# Patient Record
Sex: MALE | Race: WHITE | NOT HISPANIC OR LATINO | Employment: STUDENT | URBAN - METROPOLITAN AREA
[De-identification: names, ages, dates, MRNs, and addresses within clinical notes are randomized per-mention and may not be internally consistent; named-entity substitution may affect disease eponyms.]

---
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
## 2025-04-16 ENCOUNTER — TELEPHONE (OUTPATIENT)
Dept: PEDIATRICS | Facility: CLINIC | Age: 20
End: 2025-04-16
Payer: COMMERCIAL

## 2025-04-17 ENCOUNTER — OFFICE VISIT (OUTPATIENT)
Dept: PEDIATRICS | Facility: CLINIC | Age: 20
End: 2025-04-17
Payer: COMMERCIAL

## 2025-04-17 VITALS — WEIGHT: 150.4 LBS | HEIGHT: 69 IN | BODY MASS INDEX: 22.28 KG/M2

## 2025-04-17 DIAGNOSIS — Z23 NEED FOR PROPHYLACTIC VACCINATION WITH TYPHOID-PARATYPHOID (TAB) VACCINE: ICD-10-CM

## 2025-04-17 DIAGNOSIS — Z91.89 AT RISK FOR INFECTIOUS DISEASE DUE TO RECENT FOREIGN TRAVEL: ICD-10-CM

## 2025-04-17 DIAGNOSIS — Z71.84 ENCOUNTER FOR COUNSELING FOR TRAVEL: Primary | ICD-10-CM

## 2025-04-17 DIAGNOSIS — Z29.89 NEED FOR MALARIA PROPHYLAXIS: ICD-10-CM

## 2025-04-17 PROCEDURE — 3008F BODY MASS INDEX DOCD: CPT | Performed by: PEDIATRICS

## 2025-04-17 PROCEDURE — 99214 OFFICE O/P EST MOD 30 MIN: CPT | Performed by: PEDIATRICS

## 2025-04-17 PROCEDURE — 1036F TOBACCO NON-USER: CPT | Performed by: PEDIATRICS

## 2025-04-17 RX ORDER — ATOVAQUONE AND PROGUANIL HYDROCHLORIDE 250; 100 MG/1; MG/1
1 TABLET, FILM COATED ORAL DAILY
Qty: 21 TABLET | Refills: 0 | Status: SHIPPED | OUTPATIENT
Start: 2025-04-17

## 2025-04-17 RX ORDER — AZITHROMYCIN 500 MG/1
500 TABLET, FILM COATED ORAL DAILY
Qty: 3 TABLET | Refills: 0 | Status: SHIPPED | OUTPATIENT
Start: 2025-04-17 | End: 2025-04-20

## 2025-04-17 RX ORDER — TRETINOIN 0.25 MG/G
CREAM TOPICAL
COMMUNITY
Start: 2024-12-16

## 2025-04-17 RX ORDER — CLINDAMYCIN PHOSPHATE AND BENZOYL PEROXIDE 10; 50 MG/G; MG/G
GEL TOPICAL
COMMUNITY
Start: 2024-12-17

## 2025-04-17 NOTE — PATIENT INSTRUCTIONS
Malaria Prophylaxis:  Malarone: Begin taking 1 tablet 2 days prior to travel, once daily during travel and continue taking for 7 days after travel. Please try to take at the same time every day. This should be taken with food or a milk-based drink.   Side effects: abdominal pain, nausea, vomiting, headache.     Typhoid Prophylaxis:  Vivotif: This is a live vaccine that you take by mouth. You must REFRIGERATE these capsules. You will take 1 capsule every other day x 4 doses. It must be swallowed whole. It works best if you take it 1 hour before a meal and >2 hours after a previous meal. This must be completed  >1 week prior to exposure. This vaccine lasts for 5 years.   Side effects: headache, nausea, vomiting, diarrhea, headache, rash, fever.     Travel's diarrhea:  At the onset of symptoms, please start azithromycin 1 tablet (500mg) daily x 3 days.

## 2025-04-17 NOTE — PROGRESS NOTES
"Pediatric Sick Encounter Note    Subjective   Patient ID: Tirso Gonzales is a 19 y.o. male who presents for travling (Traveling vaccine discussion).  Today he is accompanied by accompanied by mother.     HPI  May 14-27th will be traveling to [facility]   He is up to date on vaccines otherwise  Travel itinerary provided.   He is allergic to sulfa drugs - states a reaction as a young child    Review of Systems    Objective   Ht 1.753 m (5' 9\")   Wt 68.2 kg (150 lb 6.4 oz)   BMI 22.21 kg/m²   BSA: 1.82 meters squared  Growth percentiles: 41 %ile (Z= -0.22) based on [facility] (Boys, 2-20 Years) Stature-for-age data based on Stature recorded on 4/17/2025. 42 %ile (Z= -0.20) based on CDC (Boys, 2-20 Years) weight-for-age data using data from 4/17/2025.     Physical Exam  Vitals and nursing note reviewed.   Constitutional:       Appearance: Normal appearance.   Pulmonary:      Effort: Pulmonary effort is normal.   Skin:     Findings: No rash.   Neurological:      Mental Status: He is alert.         Assessment/Plan   Diagnoses and all orders for this visit:  Encounter for counseling for travel  Need for malaria prophylaxis  -     atovaquone-proguaniL (Malarone) 250-100 mg tablet; Take 1 tablet by mouth once daily.  Need for prophylactic vaccination with typhoid-paratyphoid (TAB) vaccine  -     typhoid (Vivotif) DR capsule vaccine; Take 1 capsule by mouth every other day for 7 days. Do not crush, chew, or split.  At risk for infectious disease due to recent foreign travel  -     atovaquone-proguaniL (Malarone) 250-100 mg tablet; Take 1 tablet by mouth once daily.  -     typhoid (Vivotif) DR capsule vaccine; Take 1 capsule by mouth every other day for 7 days. Do not crush, chew, or split.  -     azithromycin (Zithromax) 500 mg tablet; Take 1 tablet (500 mg) by mouth once daily for 3 days.  Tirso is  a 19 year old male who presents for travel counseling. He will be traveling in 1 month to [facility]. Case was discussed with  " Lata (RBC ID) as well. Long discussed regarding prophylaxis recommended per CDC guidelines, how medications should be stored, how medications should be taken and side effects.  Malaria Prophylaxis:  Malarone: Begin taking 1 tablet 2 days prior to travel, once daily during travel and continue taking for 7 days after travel. Please try to take at the same time every day. This should be taken with food or a milk-based drink.   Side effects: abdominal pain, nausea, vomiting, headache.     Typhoid Prophylaxis:  Vivotif: This is a live vaccine that you take by mouth. You must REFRIGERATE these capsules. You will take 1 capsule every other day x 4 doses. It must be swallowed whole. It works best if you take it 1 hour before a meal and >2 hours after a previous meal. This must be completed  >1 week prior to exposure. This vaccine lasts for 5 years.   Side effects: headache, nausea, vomiting, diarrhea, headache, rash, fever.     Travel's diarrhea:  At the onset of symptoms, please start azithromycin 1 tablet (500mg) daily x 3 days.   Total time: 35 minutes (>50% was directly with patient counseling)

## 2025-07-01 ASSESSMENT — DERMATOLOGY QUALITY OF LIFE (QOL) ASSESSMENT
RATE HOW EMOTIONALLY BOTHERED YOU ARE BY YOUR SKIN PROBLEM (FOR EXAMPLE, WORRY, EMBARRASSMENT, FRUSTRATION): 2
RATE HOW BOTHERED YOU ARE BY SYMPTOMS OF YOUR SKIN PROBLEM (EG, ITCHING, STINGING BURNING, HURTING OR SKIN IRRITATION): 3
RATE HOW BOTHERED YOU ARE BY EFFECTS OF YOUR SKIN PROBLEMS ON YOUR ACTIVITIES (EG, GOING OUT, ACCOMPLISHING WHAT YOU WANT, WORK ACTIVITIES OR YOUR RELATIONSHIPS WITH OTHERS): 3
RATE HOW BOTHERED YOU ARE BY EFFECTS OF YOUR SKIN PROBLEMS ON YOUR ACTIVITIES (EG, GOING OUT, ACCOMPLISHING WHAT YOU WANT, WORK ACTIVITIES OR YOUR RELATIONSHIPS WITH OTHERS): 3
RATE HOW BOTHERED YOU ARE BY SYMPTOMS OF YOUR SKIN PROBLEM (EG, ITCHING, STINGING BURNING, HURTING OR SKIN IRRITATION): 3
DATE THE QUALITY-OF-LIFE ASSESSMENT WAS COMPLETED: 67387
WHAT SINGLE SKIN CONDITION LISTED BELOW IS THE PATIENT ANSWERING THE QUALITY-OF-LIFE ASSESSMENT QUESTIONS ABOUT: ACNE
WHAT SINGLE SKIN CONDITION LISTED BELOW IS THE PATIENT ANSWERING THE QUALITY-OF-LIFE ASSESSMENT QUESTIONS ABOUT: ACNE
RATE HOW EMOTIONALLY BOTHERED YOU ARE BY YOUR SKIN PROBLEM (FOR EXAMPLE, WORRY, EMBARRASSMENT, FRUSTRATION): 2

## 2025-07-01 ASSESSMENT — PATIENT GLOBAL ASSESSMENT (PGA): WHAT IS THE PGA: PATIENT GLOBAL ASSESSMENT:  3 - MODERATE

## 2025-07-02 ENCOUNTER — APPOINTMENT (OUTPATIENT)
Dept: DERMATOLOGY | Facility: CLINIC | Age: 20
End: 2025-07-02
Payer: COMMERCIAL

## 2025-07-02 DIAGNOSIS — L70.0 ACNE VULGARIS: Primary | ICD-10-CM

## 2025-07-02 PROCEDURE — 99204 OFFICE O/P NEW MOD 45 MIN: CPT | Performed by: DERMATOLOGY

## 2025-07-02 PROCEDURE — 1036F TOBACCO NON-USER: CPT | Performed by: DERMATOLOGY

## 2025-07-02 RX ORDER — CLINDAMYCIN PHOSPHATE 10 UG/ML
LOTION TOPICAL
Qty: 120 ML | Refills: 11 | Status: SHIPPED | OUTPATIENT
Start: 2025-07-02

## 2025-07-02 RX ORDER — TRETINOIN 0.5 MG/G
CREAM TOPICAL
Qty: 45 G | Refills: 11 | Status: SHIPPED | OUTPATIENT
Start: 2025-07-02

## 2025-07-02 RX ORDER — BENZOYL PEROXIDE 50 MG/ML
LIQUID TOPICAL
Qty: 236 ML | Refills: 11 | Status: SHIPPED | OUTPATIENT
Start: 2025-07-02

## 2025-07-02 RX ORDER — CLINDAMYCIN PHOSPHATE 10 UG/ML
LOTION TOPICAL
Qty: 120 ML | Refills: 11 | Status: SHIPPED | OUTPATIENT
Start: 2025-07-02 | End: 2025-07-02

## 2025-07-02 ASSESSMENT — DERMATOLOGY QUALITY OF LIFE (QOL) ASSESSMENT
WHAT SINGLE SKIN CONDITION LISTED BELOW IS THE PATIENT ANSWERING THE QUALITY-OF-LIFE ASSESSMENT QUESTIONS ABOUT: ACNE
RATE HOW BOTHERED YOU ARE BY SYMPTOMS OF YOUR SKIN PROBLEM (EG, ITCHING, STINGING BURNING, HURTING OR SKIN IRRITATION): 0 - NEVER BOTHERED
RATE HOW EMOTIONALLY BOTHERED YOU ARE BY YOUR SKIN PROBLEM (FOR EXAMPLE, WORRY, EMBARRASSMENT, FRUSTRATION): 2
ARE THERE EXCLUSIONS OR EXCEPTIONS FOR THE QUALITY OF LIFE ASSESSMENT: NO
DATE THE QUALITY-OF-LIFE ASSESSMENT WAS COMPLETED: 67388
RATE HOW BOTHERED YOU ARE BY EFFECTS OF YOUR SKIN PROBLEMS ON YOUR ACTIVITIES (EG, GOING OUT, ACCOMPLISHING WHAT YOU WANT, WORK ACTIVITIES OR YOUR RELATIONSHIPS WITH OTHERS): 0 - NEVER BOTHERED

## 2025-07-02 ASSESSMENT — DERMATOLOGY PATIENT ASSESSMENT
ARE YOU AN ORGAN TRANSPLANT RECIPIENT: NO
DO YOU HAVE ANY NEW OR CHANGING LESIONS: NO
DO YOU USE SUNSCREEN: OCCASIONALLY
HAVE YOU HAD OR DO YOU HAVE VASCULAR DISEASE: NO
HAVE YOU HAD OR DO YOU HAVE A STAPH INFECTION: NO
DO YOU USE A TANNING BED: NO

## 2025-07-02 ASSESSMENT — PATIENT GLOBAL ASSESSMENT (PGA): PATIENT GLOBAL ASSESSMENT: PATIENT GLOBAL ASSESSMENT:  1 - CLEAR

## 2025-07-02 ASSESSMENT — ITCH NUMERIC RATING SCALE: HOW SEVERE IS YOUR ITCHING?: 0

## 2025-07-02 NOTE — PATIENT INSTRUCTIONS
AM: Shower, use benzoyl peroxide wash on face/chest/back. Let sit 5 min, then wash off. This will bleach your clothing.    Dry off, use clindamycin lotion in morning.    Put on sunscreen moisturizer.    PM: Clean face with a gentle wash, apply tretinoin topical to the face, thin layer. Then use a moisturizer. This may be irritating.

## 2025-07-02 NOTE — PROGRESS NOTES
Subjective     Tirso Gonzales is a 20 y.o. male who presents for the following: Acne (Acne on face and shoulders. Has reports using tretinoin cream every morning. He uses a Neutrogena face wash in the mornings as well. Previously seen at [place] and [place].). He is currently using tretinoin 0.025% cream every morning to the full face. He states he gets flares on shoulder, back, and chest. He denies having nodules or painful lesions. He states he is moderately bothered by his acne. He has tried minocycline and doxycyline in the past for several months (states stomach upset with doxycyline). He reports having tried several different strengths of tretinoin in the past.     Here today with his mother.   Skin Cancer History  Biopsy Log Book  No skin cancers from Specimen Tracking.    Additional History      Review of Systems:  No other skin or systemic complaints other than what is documented elsewhere in the note.    The following portions of the chart were reviewed this encounter and updated as appropriate:       Specialty Problems    None    Past Medical History:  Tirso Gonzales  has a past medical history of ADHD (attention deficit hyperactivity disorder), inattentive type (10/12/2023), Allergic rhinitis (10/12/2023), Allergic rhinitis, unspecified (08/20/2015), Allergy status to unspecified drugs, medicaments and biological substances, Bilateral otitis media (10/12/2023), Hyperopia (10/12/2023), Nausea in child (10/12/2023), and Thalassemia trait (10/12/2023).    Past Surgical History:  Tirso Gonzales  has a past surgical history that includes Tonsillectomy (07/28/2014) and Tonsillectomy (09/05/2017).    Family History:  Patient family history includes Acne (age of onset: 10 - 19) in his father and sister; Arthritis (age of onset: 60 - 69) in his maternal grandmother; Basal cell carcinoma (age of onset: 70 - 79) in his maternal grandfather.    Social History:  Tirso Gonzales  reports that he has  never smoked. He has never used smokeless tobacco. He reports that he does not drink alcohol and does not use drugs.    Allergies:  Sulfa (sulfonamide antibiotics) and Sulfasalazine    Current Medications / CAM's:  Current Medications[1]     Objective   Well appearing patient in no apparent distress; mood and affect are within normal limits.    A focused examination was performed of the face, chest, and back. All findings within normal limits unless otherwise noted below.    Head - Anterior (Face)  Scattered comedones and inflammatory papulopustules prominently on the malar/buccal cheeks, nose, jawline, shoulders, upper back and chest. Early scarring on the cheeks.     Assessment/Plan   ACNE VULGARIS  Head - Anterior (Face)  Acne vulgaris, moderate, mixed comedonal and papular - face, chest, and back. Early scarring on the cheeks  - The multifactorial nature of this condition and various treatment options were discussed with the patient in clinic today.  - We reviewed a topical regimen for treatment or escalation to isotretinoin due to the presence of early scarring after numerous topical therapies. Reviewed isotretinoin would be a minimum of 6 months and it is a weight-based dosing. Patient would prefer topical therapies at this time and will consider isotretinoin if minimal improvement. Reviewed blood tests are needed on isotretinoin and monthly virtual visits for monitoring.  - Start Benzoyl peroxide 5% wash in the mornings. Use daily on the face, chest, shoulder, back. Work into a lather and let sit for 5 minutes before washing. May bleach towels and clothes.  - Start Clindamycin 1% lotion to apply to affected areas following benzoyl peroxide wash each day  - INCREASE Tretinoin to 0.05% cream, apply pea-sized amount to face every other night, increase to nightly if not too dry.   - Start an AM face moisturizer with SPF 30+, noncomedogenic. May layer over AM prescription once dry.  - Start a noncomedogenic face  moisturizer, apply nightly; may layer over nightime cream once dry.  - May use a gentle non-medicated face wash every evening or plain water to wash at night. Examples are Cerave, Cetaphil, Neutrogena UltraGentle.   - Risks, benefits, alternatives, and side effects of the above medications discussed with the patient in clinic today.  - Reviewed need for 2-3 months on any treatment regimen before seeing improvement.    benzoyl peroxide 5 % external wash - Head - Anterior (Face)  Use daily on the face, shoulders, chest, back. Work into a lather and let sit for 5 minutes before washing. May bleach towels and clothes.    tretinoin (Retin-A) 0.05 % cream - Head - Anterior (Face)  Apply a small 1/2 pea sized amount to clean dry face at bedtime.  Start off 2x/week and increase as tolerated.  Related Procedures  Follow Up In Dermatology - Established Patient  Related Medications  clindamycin (Cleocin T) 1 % lotion  Apply topically once daily to the face, shoulders, back/chest.  Return to clinic in 2-3 months for acne follow up.      Aurora Garcia MD  Department of Dermatology    I saw and evaluated the patient. I personally obtained the key and critical portions of the history and physical exam or was physically present for key and critical portions performed by the resident/fellow. I reviewed the resident/fellow's documentation and discussed the patient with the resident/fellow. I agree with the resident/fellow's medical decision making as documented in the note.    Joleen Mcgarry MD            [1]   Current Outpatient Medications:     atovaquone-proguaniL (Malarone) 250-100 mg tablet, Take 1 tablet by mouth once daily., Disp: 21 tablet, Rfl: 0    tretinoin (Retin-A) 0.025 % cream, APPLY TO FACE IN THE MORNING START 2-3 NIGHTS WEEKLY, SLOWLY INCREASE TO EVERY NIGHT AS TOLERATED, Disp: , Rfl:     benzoyl peroxide 5 % external wash, Use daily on the face, shoulders, chest, back. Work into a lather and let sit for 5 minutes  before washing. May bleach towels and clothes., Disp: 236 mL, Rfl: 11    clindamycin (Cleocin T) 1 % lotion, Apply topically once daily to the face, shoulders, back/chest., Disp: 120 mL, Rfl: 11    clindamycin-benzoyl peroxide (Duac) 1.2-5% gel, APPLY PEA SIZE AMOUNT TO ENTIRE FACE ONCE DAILY (Patient not taking: Reported on 7/2/2025), Disp: , Rfl:     tretinoin (Retin-A) 0.05 % cream, Apply a small 1/2 pea sized amount to clean dry face at bedtime.  Start off 2x/week and increase as tolerated., Disp: 45 g, Rfl: 11

## 2025-07-02 NOTE — Clinical Note
Scattered comedones and inflammatory papulopustules prominently on the malar/buccal cheeks, nose, jawline, shoulders, upper back and chest. Early scarring on the cheeks.

## 2025-07-02 NOTE — Clinical Note
Acne vulgaris, moderate, mixed comedonal and papular - face, chest, and back. Early scarring on the cheeks  - The multifactorial nature of this condition and various treatment options were discussed with the patient in clinic today.  - We reviewed a topical regimen for treatment or escalation to isotretinoin due to the presence of early scarring after numerous topical therapies. Reviewed isotretinoin would be a minimum of 6 months and it is a weight-based dosing. Patient would prefer topical therapies at this time and will consider isotretinoin if minimal improvement. Reviewed blood tests are needed on isotretinoin and monthly virtual visits for monitoring.  - Start Benzoyl peroxide 5% wash in the mornings. Use daily on the face, chest, shoulder, back. Work into a lather and let sit for 5 minutes before washing. May bleach towels and clothes.  - Start Clindamycin 1% lotion to apply to affected areas following benzoyl peroxide wash each day  - INCREASE Tretinoin to 0.05% cream, apply pea-sized amount to face every other night, increase to nightly if not too dry.   - Start an AM face moisturizer with SPF 30+, noncomedogenic. May layer over AM prescription once dry.  - Start a noncomedogenic face moisturizer, apply nightly; may layer over nightime cream once dry.  - May use a gentle non-medicated face wash every evening or plain water to wash at night. Examples are Cerave, Cetaphil, Neutrogena UltraGentle.   - Risks, benefits, alternatives, and side effects of the above medications discussed with the patient in clinic today.  - Reviewed need for 2-3 months on any treatment regimen before seeing improvement.

## 2025-08-07 ENCOUNTER — OFFICE VISIT (OUTPATIENT)
Dept: PEDIATRICS | Facility: CLINIC | Age: 20
End: 2025-08-07
Payer: COMMERCIAL

## 2025-08-07 VITALS
DIASTOLIC BLOOD PRESSURE: 64 MMHG | SYSTOLIC BLOOD PRESSURE: 110 MMHG | WEIGHT: 157.6 LBS | HEIGHT: 69 IN | HEART RATE: 72 BPM | BODY MASS INDEX: 23.34 KG/M2

## 2025-08-07 DIAGNOSIS — Z71.82 ENCOUNTER FOR EXERCISE COUNSELING: ICD-10-CM

## 2025-08-07 DIAGNOSIS — Z00.00 ENCOUNTER FOR ROUTINE ADULT HEALTH EXAMINATION WITHOUT ABNORMAL FINDINGS: Primary | ICD-10-CM

## 2025-08-07 DIAGNOSIS — Z71.3 ENCOUNTER FOR NUTRITIONAL COUNSELING: ICD-10-CM

## 2025-08-07 PROCEDURE — 1036F TOBACCO NON-USER: CPT | Performed by: PEDIATRICS

## 2025-08-07 PROCEDURE — 99395 PREV VISIT EST AGE 18-39: CPT | Performed by: PEDIATRICS

## 2025-08-07 PROCEDURE — 96127 BRIEF EMOTIONAL/BEHAV ASSMT: CPT | Performed by: PEDIATRICS

## 2025-08-07 PROCEDURE — 3008F BODY MASS INDEX DOCD: CPT | Performed by: PEDIATRICS

## 2025-08-07 SDOH — HEALTH STABILITY: MENTAL HEALTH: RISK FACTORS RELATED TO TOBACCO: 0

## 2025-08-07 SDOH — HEALTH STABILITY: MENTAL HEALTH: RISK FACTORS RELATED TO EMOTIONS: 0

## 2025-08-07 SDOH — HEALTH STABILITY: MENTAL HEALTH: RISK FACTORS RELATED TO DRUGS: 0

## 2025-08-07 ASSESSMENT — ANXIETY QUESTIONNAIRES
6. BECOMING EASILY ANNOYED OR IRRITABLE: NOT AT ALL
4. TROUBLE RELAXING: NOT AT ALL
2. NOT BEING ABLE TO STOP OR CONTROL WORRYING: NOT AT ALL
7. FEELING AFRAID AS IF SOMETHING AWFUL MIGHT HAPPEN: NOT AT ALL
4. TROUBLE RELAXING: NOT AT ALL
5. BEING SO RESTLESS THAT IT IS HARD TO SIT STILL: NOT AT ALL
GAD7 TOTAL SCORE: 0
5. BEING SO RESTLESS THAT IT IS HARD TO SIT STILL: NOT AT ALL
7. FEELING AFRAID AS IF SOMETHING AWFUL MIGHT HAPPEN: NOT AT ALL
3. WORRYING TOO MUCH ABOUT DIFFERENT THINGS: NOT AT ALL
3. WORRYING TOO MUCH ABOUT DIFFERENT THINGS: NOT AT ALL
1. FEELING NERVOUS, ANXIOUS, OR ON EDGE: NOT AT ALL
1. FEELING NERVOUS, ANXIOUS, OR ON EDGE: NOT AT ALL
6. BECOMING EASILY ANNOYED OR IRRITABLE: NOT AT ALL
2. NOT BEING ABLE TO STOP OR CONTROL WORRYING: NOT AT ALL

## 2025-08-07 ASSESSMENT — PATIENT HEALTH QUESTIONNAIRE - PHQ9
5. POOR APPETITE OR OVEREATING: NOT AT ALL
2. FEELING DOWN, DEPRESSED OR HOPELESS: NOT AT ALL
8. MOVING OR SPEAKING SO SLOWLY THAT OTHER PEOPLE COULD HAVE NOTICED. OR THE OPPOSITE - BEING SO FIDGETY OR RESTLESS THAT YOU HAVE BEEN MOVING AROUND A LOT MORE THAN USUAL: NOT AT ALL
5. POOR APPETITE OR OVEREATING: NOT AT ALL
7. TROUBLE CONCENTRATING ON THINGS, SUCH AS READING THE NEWSPAPER OR WATCHING TELEVISION: NOT AT ALL
SUM OF ALL RESPONSES TO PHQ9 QUESTIONS 1 & 2: 0
9. THOUGHTS THAT YOU WOULD BE BETTER OFF DEAD, OR OF HURTING YOURSELF: NOT AT ALL
8. MOVING OR SPEAKING SO SLOWLY THAT OTHER PEOPLE COULD HAVE NOTICED. OR THE OPPOSITE, BEING SO FIGETY OR RESTLESS THAT YOU HAVE BEEN MOVING AROUND A LOT MORE THAN USUAL: NOT AT ALL
7. TROUBLE CONCENTRATING ON THINGS, SUCH AS READING THE NEWSPAPER OR WATCHING TELEVISION: NOT AT ALL
9. THOUGHTS THAT YOU WOULD BE BETTER OFF DEAD, OR OF HURTING YOURSELF: NOT AT ALL
4. FEELING TIRED OR HAVING LITTLE ENERGY: NOT AT ALL
3. TROUBLE FALLING OR STAYING ASLEEP: NOT AT ALL
1. LITTLE INTEREST OR PLEASURE IN DOING THINGS: NOT AT ALL
SUM OF ALL RESPONSES TO PHQ QUESTIONS 1-9: 0
3. TROUBLE FALLING OR STAYING ASLEEP OR SLEEPING TOO MUCH: NOT AT ALL
6. FEELING BAD ABOUT YOURSELF - OR THAT YOU ARE A FAILURE OR HAVE LET YOURSELF OR YOUR FAMILY DOWN: NOT AT ALL
6. FEELING BAD ABOUT YOURSELF - OR THAT YOU ARE A FAILURE OR HAVE LET YOURSELF OR YOUR FAMILY DOWN: NOT AT ALL
2. FEELING DOWN, DEPRESSED OR HOPELESS: NOT AT ALL
4. FEELING TIRED OR HAVING LITTLE ENERGY: NOT AT ALL
1. LITTLE INTEREST OR PLEASURE IN DOING THINGS: NOT AT ALL

## 2025-08-07 ASSESSMENT — ENCOUNTER SYMPTOMS
CONSTIPATION: 0
SLEEP DISTURBANCE: 0
SNORING: 0
DIARRHEA: 0

## 2025-08-07 NOTE — PROGRESS NOTES
Subjective   History was provided by the patient.  Tirso Gonzales is a 20 y.o. male who is here for this well child visit.  Immunization History   Administered Date(s) Administered    COVID-19, mRNA, LNP-S, PF, 30 mcg/0.3 mL dose 05/30/2021, 06/20/2021    DTaP vaccine, pediatric  (INFANRIX) 2005, 2005, 2005, 01/05/2007, 07/30/2010    Flu vaccine (IIV4), preservative free *Check age/dose* 09/08/2016    HPV 9-valent vaccine (GARDASIL 9) 07/07/2017, 08/21/2018    Hepatitis A vaccine, pediatric/adolescent (HAVRIX, VAQTA) 07/28/2014, 02/25/2015    Hepatitis B vaccine, 19 yrs and under (RECOMBIVAX, ENGERIX) 2005, 2005, 06/20/2006    HiB PRP-OMP conjugate vaccine, pediatric (PEDVAXHIB) 2005, 2005, 06/20/2006    Influenza, Unspecified 01/05/2009, 10/06/2009, 08/16/2010, 10/08/2011, 09/21/2012, 09/27/2013    Influenza, injectable, quadrivalent 10/25/2017, 10/17/2019, 10/30/2020, 10/06/2021    Influenza, live, intranasal, quadrivalent 10/16/2014, 10/09/2018    MMR vaccine, subcutaneous (MMR II) 06/20/2006, 08/16/2010    Meningococcal ACWY vaccine (MENVEO) 08/03/2021    Meningococcal ACWY-D (Menactra) 4-valent conjugate vaccine 07/22/2016    Meningococcal B vaccine (BEXSERO) 08/08/2024    Meningococcal B, Unspecified 08/16/2022    Pneumococcal Conjugate PCV 7 2005, 2005, 2005, 09/26/2006    Poliovirus vaccine, subcutaneous (IPOL) 2005, 2005, 01/05/2007, 07/30/2010    Td vaccine, age 7 years and older (TDVAX) 08/11/2020    Tdap vaccine, age 7 year and older (BOOSTRIX, ADACEL) 07/20/2015    Varicella vaccine, subcutaneous (VARIVAX) 06/20/2006, 08/16/2010     History of previous adverse reactions to immunizations? no  The following portions of the patient's history were reviewed by a provider in this encounter and updated as appropriate:  Tobacco  Allergies  Meds  Problems  Med Hx  Surg Hx  Fam Hx       Well Child Assessment:  History provided by:  self.   Nutrition  Types of intake include cereals, cow's milk, eggs, fruits, meats and vegetables.   Dental  The patient has a dental home. The patient brushes teeth regularly. The patient flosses regularly. Last dental exam was less than 6 months ago.   Elimination  Elimination problems do not include constipation, diarrhea or urinary symptoms.   Behavioral  Behavioral issues do not include misbehaving with peers or performing poorly at school.   Sleep  Average sleep duration (hrs): >8 hours. The patient does not snore. There are no sleep problems.   Safety  Home has working smoke alarms? yes. Home has working carbon monoxide alarms? yes.   School  Grade level in school: [employer]. Data Science major.   Screening  There are no risk factors for sexually transmitted infections. There are no risk factors related to alcohol. There are no risk factors related to emotions. There are no risk factors related to drugs. There are no risk factors related to tobacco.   Denies being bullied or being a bully.   Not sexually active.   Denies tobacco, drugs or alcohol.  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 No  Have you ever had exercise-related chest pain or shortness of breath? No  Has anyone in your immediate family (parents, grandparents, siblings) or other, more distant relatives (aunts, uncles, cousins)  of heart problems or had an unexpected sudden death before age 50? This would include unexpected  "drownings, unexplained auto crashes in which the relative was driving, or SIDS. No  Are you related to anyone with HCM or hypertrophic obstructive cardiomyopathy, Marfan syndrome, ACM, LQTS, short QT syndrome, BrS, or CPVT or anyone younger than 50 years with a pacemaker or implantable defibrillator? no      Objective   Vitals:    08/07/25 1632   BP: 110/64   Pulse: 72   Weight: 71.5 kg (157 lb 9.6 oz)   Height: 1.753 m (5' 9\")     Growth parameters are noted and are appropriate for age.  Physical Exam  Vitals and nursing note reviewed. Exam conducted with a chaperone present (Osmar MILAN LPN).   Constitutional:       General: He is not in acute distress.     Appearance: Normal appearance.   HENT:      Head: Normocephalic and atraumatic.      Right Ear: Tympanic membrane, ear canal and external ear normal.      Left Ear: Tympanic membrane, ear canal and external ear normal.      Nose: Nose normal.      Mouth/Throat:      Mouth: Mucous membranes are moist.      Pharynx: Oropharynx is clear.     Eyes:      Extraocular Movements: Extraocular movements intact.      Conjunctiva/sclera: Conjunctivae normal.      Pupils: Pupils are equal, round, and reactive to light.       Cardiovascular:      Rate and Rhythm: Normal rate and regular rhythm.      Pulses: Normal pulses.      Heart sounds: Normal heart sounds. No murmur heard.  Pulmonary:      Effort: Pulmonary effort is normal. No respiratory distress.      Breath sounds: Normal breath sounds.   Abdominal:      General: Bowel sounds are normal. There is no distension.      Palpations: Abdomen is soft.      Tenderness: There is no abdominal tenderness.   Genitourinary:     Penis: Normal.       Comments: Amado stage 5    Musculoskeletal:         General: No deformity. Normal range of motion.      Cervical back: Normal range of motion and neck supple.      Right lower leg: No edema.      Left lower leg: No edema.     Skin:     General: Skin is warm.      Capillary Refill: " Capillary refill takes less than 2 seconds.      Findings: No rash.     Neurological:      General: No focal deficit present.      Mental Status: He is alert and oriented to person, place, and time. Mental status is at baseline.      Gait: Gait normal.     Psychiatric:         Mood and Affect: Mood normal.         Assessment/Plan   Well adolescent.  Encounter Diagnoses   Name Primary?    Encounter for routine adult health examination without abnormal findings Yes    BMI 23.0-23.9, adult     Encounter for exercise counseling     Encounter for nutritional counseling      1. Anticipatory guidance discussed.  Gave handout on well-child issues at this age.  2.  Weight management:  The patient was counseled regarding nutrition and physical activity. BMI 23.2   3. Development: appropriate for age  4. Vaccines up to date.   5. Follow-up visit in 1 year for next well child visit, or sooner as needed.  6. Screening lipid and Hg normal 2 years ago  7. Patient Health Questionnaire-9 Score: (Patient-Rptd) 0 (8/7/2025  4:29 PM)  Calculated Risk Score: (Patient-Rptd) No intervention is necessary (8/7/2025  4:29 PM)  8. CHEY-7 Total Score: (Patient-Rptd) 0 (8/7/2025  4:28 PM)  9. No concerns about hearing or vision.

## 2025-08-11 ENCOUNTER — APPOINTMENT (OUTPATIENT)
Dept: PEDIATRICS | Facility: CLINIC | Age: 20
End: 2025-08-11
Payer: COMMERCIAL

## 2025-10-29 ENCOUNTER — APPOINTMENT (OUTPATIENT)
Dept: DERMATOLOGY | Facility: CLINIC | Age: 20
End: 2025-10-29
Payer: COMMERCIAL